# Patient Record
Sex: FEMALE | Race: WHITE | NOT HISPANIC OR LATINO | Employment: OTHER | URBAN - METROPOLITAN AREA
[De-identification: names, ages, dates, MRNs, and addresses within clinical notes are randomized per-mention and may not be internally consistent; named-entity substitution may affect disease eponyms.]

---

## 2024-05-16 ENCOUNTER — TELEPHONE (OUTPATIENT)
Age: 79
End: 2024-05-16

## 2024-05-16 NOTE — TELEPHONE ENCOUNTER
PT APPT CX, SHE STATED THAT SHE IS BEEN IN FLARE UP FOR 4 WEEKS AND SHE WOULD LIKE TO SEE A DOCTOR FOR HER FLARE, CALL TO  [phone]

## 2024-05-21 PROBLEM — M05.89 OTHER RHEUMATOID ARTHRITIS WITH RHEUMATOID FACTOR OF MULTIPLE SITES: Status: ACTIVE | Noted: 2024-05-21

## 2024-05-22 ENCOUNTER — OFFICE VISIT (OUTPATIENT)
Age: 79
End: 2024-05-22
Payer: COMMERCIAL

## 2024-05-22 VITALS
WEIGHT: 160.5 LBS | SYSTOLIC BLOOD PRESSURE: 132 MMHG | HEIGHT: 62 IN | DIASTOLIC BLOOD PRESSURE: 78 MMHG | TEMPERATURE: 97.1 F | BODY MASS INDEX: 29.53 KG/M2 | HEART RATE: 67 BPM

## 2024-05-22 DIAGNOSIS — M05.89 OTHER RHEUMATOID ARTHRITIS WITH RHEUMATOID FACTOR OF MULTIPLE SITES: Primary | ICD-10-CM

## 2024-05-22 PROBLEM — Z79.899 ENCOUNTER FOR LONG-TERM CURRENT USE OF HIGH RISK MEDICATION: Chronic | Status: ACTIVE | Noted: 2024-05-22

## 2024-05-22 PROBLEM — M81.0 AGE-RELATED OSTEOPOROSIS WITHOUT CURRENT PATHOLOGICAL FRACTURE: Chronic | Status: ACTIVE | Noted: 2024-05-22

## 2024-05-22 PROBLEM — D84.821 IMMUNODEFICIENCY DUE TO LONG TERM DRUG THERAPY: Chronic | Status: ACTIVE | Noted: 2024-05-22

## 2024-05-22 PROBLEM — Z79.899 IMMUNODEFICIENCY DUE TO LONG TERM DRUG THERAPY: Chronic | Status: ACTIVE | Noted: 2024-05-22

## 2024-05-22 RX ORDER — CALCIPOTRIENE 50 UG/G
OINTMENT TOPICAL
COMMUNITY

## 2024-05-22 RX ORDER — FLUOXETINE HYDROCHLORIDE 20 MG/1
1 CAPSULE ORAL DAILY
COMMUNITY

## 2024-05-22 RX ORDER — FLUTICASONE PROPIONATE 50 MCG
SPRAY, SUSPENSION (ML) NASAL
COMMUNITY

## 2024-05-22 RX ORDER — PREDNISONE 5 MG/1
TABLET ORAL
COMMUNITY

## 2024-05-22 RX ORDER — METHYLPREDNISOLONE ACETATE 80 MG/ML
INJECTION, SUSPENSION INTRA-ARTICULAR; INTRALESIONAL; INTRAMUSCULAR; SOFT TISSUE
COMMUNITY
Start: 2024-05-17

## 2024-05-22 NOTE — ASSESSMENT & PLAN NOTE
DEXA 08/30/2023: t-score in left femoral neck -2.2, t-score in left total hip -1.3, t-score in lumbar spine 0.9  She has been on Prolia and has demonstrated improvement in all areas.     Current RX - Prolia every 6 months (093/2022)    - She takes 2000IU vitamin D supplementation. She tries to get the calcium in her diet  - she will start weight bearing exercise  - Continue Prolia every 6 months. Her last Prolia was March 2024.  We will need to schedule her next dosing for 9/2024.  She had this at [facility].  - repeat bone density testing on or after 08/2025  - continue supplementing calcium throughout diet   - We discussed the risks and benefits of starting Prolia including but not limited to injection reaction, increase risk of infection, hypocalcemia, osteonecrosis of the jaw, atypical fractures...etc.

## 2024-05-22 NOTE — ASSESSMENT & PLAN NOTE
Q TB and hepatitis panel due if patient wishes to restart Orencia.  Patient called the office December 2023 and said that her IV Orencia was constantly being rescheduled and she did not want to take Orencia anymore at that time.  On her January 2024 visit with Dr. Aquino they determined it was okay to hold Orencia and stay on methotrexate at that time with the understanding that Orencia may need to be added back at some point.

## 2024-05-22 NOTE — ASSESSMENT & PLAN NOTE
- Continue CBC, CMP, ESR, CRP every 3 months.  - No infections or toxicity to medication  - I did recommend that she consider getting the new shingles vaccine  - We discussed the possible side effects of methotrexate including, but not limited to: oral ulcers, nausea, diarrhea, rash, increased infection risk, bone marrow suppression, hepatitis, pulmonary toxicity.  We discussed the need to avoid alcohol and to have regulatory lab monitoring done while taking methotrexate. We discussed that live virus vaccines are contraindicated while taking methotrexate.

## 2024-05-22 NOTE — ASSESSMENT & PLAN NOTE
On methotrexate 15 mg weekly.  Previously on Orencia since 2014.  Previously on Arava.  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  Dr. Schuler initially saw her when she was at the [facility] in the early 1990s.  Finally she developed polyarticular joint pain that persisted.    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  The next day she felt much worse.  She started a prednisone taper that she was given by Dr. Aquino a year ago when she went to [facility] but never took.  This did help.  -She was feeling better and went to a graduation in [place].  However within 24 hours she developed severe coughing, runny nose, and sneezing.  This was determined to be a [place] tree allergy.  She was feeling worse again.  -She then went and saw her PCP and was given a second prednisone taper.  She is currently on 15 mg a day.  She was also given Depo-Medrol IM.  She reports feeling 90% better.  - hold on Orencia for now; we did discuss that we may need to add this back.    -We will wait to see how she does with the second prednisone taper.  I did instruct her to let us know if she flares after she completes the taper.  If so then we will definitely need to look at adding her and see her back.  She verbalized understanding.  - She feels her joint pain is OA related and not RA related.  - Labs every 3 months  - Continue methotrexate 12.5 mg weekly and daily folic acid-- she has GI upset with higher doses

## 2024-05-22 NOTE — PROGRESS NOTES
Office Visit       Date: 05/22/2024   Patient Name: Adali aDvis  MRN: [id number]  YOB: 1945    Referring Physician: Pat Stringer*     Chief Complaint:   Chief Complaint   Patient presents with    Rheumatoid Arthritis       History of Present Illness: Adali Davis is a 79 y.o. female who is here today for follow-up of rheumatoid arthritis.  She reports having a flare she is feeling worse.  She rates her pain 8 out of 10 and has 1 hour of morning stiffness.  She has joint pain and swelling.  Low back pain.  Neck pain.  She has a cough.  She is currently on her second round of a steroid taper and feeling 90% better.  She wanted to discuss today if I thought this could be related to being off Orencia for 7 months or was it may be from working so hard caring for 6 gravesite's in 1 day.    Subjective   Review of Systems:  Review of Systems   HENT:  Positive for congestion.    Respiratory:  Positive for cough.    Musculoskeletal:  Positive for arthralgias, back pain, joint swelling and neck pain.        Past Medical History:   Past Medical History:   Diagnosis Date    Arthritis, degenerative     Elevated liver function tests     Encounter for long-term (current) use of other medications     high risk medications    Failed total knee replacement     Foot pain, left     Osteopenia     Other rheumatoid arthritis with rheumatoid factor of multiple sites     Pain management     Total knee replacement status     Trochanteric bursitis        Past Surgical History: History reviewed. No pertinent surgical history.    Family History: History reviewed. No pertinent family history.    Social History:   Social History     Socioeconomic History    Marital status:     Highest education level: Bachelor's degree (e.g., BA, AB, BS)   Tobacco Use    Smoking status: Never    Smokeless tobacco: Never   Vaping Use    Vaping status: Never Used   Substance and Sexual Activity    Alcohol use: Yes      Comment: consumed occasionally    Drug use: Never       Medications:   Current Outpatient Medications:     acetaminophen (TYLENOL) 650 MG 8 hr tablet, Take  by mouth. Administer Acetaminophen 650mg PO pre-infusion (Orencia) as needed, Disp: , Rfl:     atenolol (TENORMIN) 50 MG tablet, Take 1 tablet by mouth Daily., Disp: , Rfl:     calcipotriene (DOVONOX) 0.005 % ointment, APPLY A THIN LAYER TO THE AFFECTED AREA(S) BY TOPICAL ROUTE ONCE DAILY ; RUB IN GENTLY AND COMPLETELY, Disp: , Rfl:     Cholecalciferol (Vitamin D3) 250 MCG (71625 UT) tablet, Take 1 tablet by mouth Daily., Disp: , Rfl:     denosumab (Prolia) 60 MG/ML solution prefilled syringe syringe, Inject 1 mL under the skin into the appropriate area as directed Every 6 (Six) Months. In upper arm, upper thigh or abdomen, Disp: , Rfl:     FLUoxetine (PROzac) 20 MG capsule, Take 1 capsule by mouth Daily., Disp: , Rfl:     fluticasone (FLONASE) 50 MCG/ACT nasal spray, Spray 1 spray every day by intranasal route., Disp: , Rfl:     folic acid (FOLVITE) 1 MG tablet, Take 1 tablet by mouth Daily., Disp: , Rfl:     methotrexate 2.5 MG tablet, Take 6 tablets by mouth 1 (One) Time Per Week., Disp: , Rfl:     methylPREDNISolone acetate (DEPO-Medrol) 80 MG/ML injection, Take 80 mg every day by injection route for 1 day., Disp: , Rfl:     predniSONE (DELTASONE) 5 MG tablet, 4 tabs po q day for 3 days and then 3 tabs po q day for 3 days the 2 tabs po q day for 3 days, then 1 tab po q day for 3 days then stop, Disp: , Rfl:     zolpidem (Ambien) 10 MG tablet, Take 1/2 to 1 tablet by mouth at bedtime for sleep, Disp: , Rfl:     abatacept (Orencia) 250 MG injection, Administer Orencia 750mg IV every 4 weeks (Patient not taking: Reported on 5/22/2024), Disp: , Rfl:     Allergies:   Allergies   Allergen Reactions    Leflunomide Other (See Comments)     Drug intolerance       I have reviewed and updated the patient's chief complaint, history of present illness, review of systems,  "past medical history, surgical history, family history, social history, medications and allergy list as appropriate.     Objective    Vital Signs:   Vitals:    05/22/24 1426   BP: 132/78   BP Location: Left arm   Patient Position: Sitting   Cuff Size: Adult   Pulse: 67   Temp: 97.1 °F (36.2 °C)   Weight: 72.8 kg (160 lb 8 oz)   Height: 157.5 cm (62\")   PainSc:   8   PainLoc: Hand     Body mass index is 29.36 kg/m².          Physical Exam:  Physical Exam  Vitals reviewed.   Constitutional:       Appearance: Normal appearance.   HENT:      Head: Normocephalic and atraumatic.      Mouth/Throat:      Mouth: Mucous membranes are moist.   Eyes:      Conjunctiva/sclera: Conjunctivae normal.   Cardiovascular:      Rate and Rhythm: Normal rate and regular rhythm.      Pulses: Normal pulses.   Pulmonary:      Effort: Pulmonary effort is normal.      Comments: She had wheezing in left lower lobe that cleared with coughing.  Musculoskeletal:         General: Normal range of motion.      Cervical back: Normal range of motion and neck supple.      Comments: She has synovitis of the 2nd right MCP and 3rd right MCP.  She is tender over all PIP and MCP's.   Skin:     General: Skin is warm and dry.   Neurological:      General: No focal deficit present.      Mental Status: She is alert and oriented to person, place, and time. Mental status is at baseline.   Psychiatric:         Mood and Affect: Mood normal.         Behavior: Behavior normal.         Thought Content: Thought content normal.         Judgment: Judgment normal.          Results Review:   Imaging Results (Last 24 Hours)       ** No results found for the last 24 hours. **            Procedures    Assessment / Plan    Assessment/Plan:   Diagnoses and all orders for this visit:    1. Other rheumatoid arthritis with rheumatoid factor of multiple sites (Primary)  Assessment & Plan:  On methotrexate 15 mg weekly.  Previously on Orencia since 2014.  Previously on Arava.  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  Dr. Schuler initially saw her when she was at the [facility] in the early 1990s.  Finally she developed polyarticular joint pain that persisted.    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  The next day she felt much worse.  She started a prednisone taper that she was given by Dr. Aquino a year ago when she went to [place] but never took.  This did help.  -She was feeling better and went to a graduation in [place].  However within 24 hours she developed severe coughing, runny nose, and sneezing.  This was determined to be a [place] tree allergy.  She was feeling worse again.  -She then went and saw her PCP and was given a second prednisone taper.  She is currently on 15 mg a day.  She was also given Depo-Medrol IM.  She reports feeling 90% better.  - hold on Orencia for now; we did discuss that we may need to add this back.    -We will wait to see how she does with the second prednisone taper.  I did instruct her to let us know if she flares after she completes the taper.  If so then we will definitely need to look at adding her and see her back.  She verbalized understanding.  - She feels her joint pain is OA related and not RA related.  - Labs every 3 months  - Continue methotrexate 12.5 mg weekly and daily folic acid-- she has GI upset with higher doses          Follow Up:   Return for Next scheduled follow up, Dr. Benjamin.        CYNTHIA Garcia  [facility]

## 2024-06-07 RX ORDER — FOLIC ACID 1 MG/1
1 TABLET ORAL DAILY
Qty: 30 TABLET | Refills: 3 | Status: SHIPPED | OUTPATIENT
Start: 2024-06-07